# Patient Record
Sex: MALE | Race: WHITE | NOT HISPANIC OR LATINO | Employment: OTHER | URBAN - METROPOLITAN AREA
[De-identification: names, ages, dates, MRNs, and addresses within clinical notes are randomized per-mention and may not be internally consistent; named-entity substitution may affect disease eponyms.]

---

## 2024-03-17 ENCOUNTER — APPOINTMENT (OUTPATIENT)
Dept: GENERAL RADIOLOGY | Facility: HOSPITAL | Age: 49
End: 2024-03-17

## 2024-03-17 ENCOUNTER — HOSPITAL ENCOUNTER (EMERGENCY)
Facility: HOSPITAL | Age: 49
Discharge: HOME | End: 2024-03-17
Attending: EMERGENCY MEDICINE | Admitting: EMERGENCY MEDICINE

## 2024-03-17 VITALS
DIASTOLIC BLOOD PRESSURE: 83 MMHG | TEMPERATURE: 98.5 F | RESPIRATION RATE: 18 BRPM | OXYGEN SATURATION: 95 % | SYSTOLIC BLOOD PRESSURE: 130 MMHG | HEART RATE: 79 BPM

## 2024-03-17 DIAGNOSIS — M25.512 ACUTE PAIN OF LEFT SHOULDER: ICD-10-CM

## 2024-03-17 DIAGNOSIS — M25.552 PAIN OF LEFT HIP: ICD-10-CM
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
## 2024-03-17 PROCEDURE — 99283 EMERGENCY DEPT VISIT LOW MDM: CPT

## 2024-03-17 PROCEDURE — 73502 X-RAY EXAM HIP UNI 2-3 VIEWS: CPT

## 2024-03-17 PROCEDURE — 25010000002 KETOROLAC TROMETHAMINE PER 15 MG

## 2024-03-17 PROCEDURE — 73030 X-RAY EXAM OF SHOULDER: CPT

## 2024-03-17 PROCEDURE — 96372 THER/PROPH/DIAG INJ SC/IM: CPT

## 2024-03-17 RX ORDER — KETOROLAC TROMETHAMINE 10 MG/1
10 TABLET, FILM COATED ORAL EVERY 6 HOURS PRN
Qty: 20 TABLET | Refills: 0 | Status: SHIPPED | OUTPATIENT
Start: 2024-03-17 | End: 2024-03-22

## 2024-03-17 RX ORDER — KETOROLAC TROMETHAMINE 30 MG/ML
60 INJECTION, SOLUTION INTRAMUSCULAR; INTRAVENOUS ONCE
Status: COMPLETED | OUTPATIENT
Start: 2024-03-17 | End: 2024-03-17

## 2024-03-17 RX ADMIN — KETOROLAC TROMETHAMINE 60 MG: 30 INJECTION, SOLUTION INTRAMUSCULAR at 10:16

## 2024-03-17 NOTE — ED PROVIDER NOTES
SHARED VISIT NOTE:    Patient is 49 y.o. year old male that presents to the ED for evaluation of tripping over a box and falling onto his side..  The patient complains of left hip and shoulder pain.        ED Course:    /83 (BP Location: Right arm, Patient Position: Sitting)   Pulse 79   Temp 98.5 °F (36.9 °C) (Oral)   Resp 18   SpO2 95%   No results found for this or any previous visit.  Medications   ketorolac (TORADOL) injection 60 mg (has no administration in time range)     XR Hip With or Without Pelvis 2 - 3 View Left    Result Date: 3/17/2024  Narrative: PROCEDURE: XR HIP W OR WO PELVIS 2-3 VIEW LEFT  COMPARISON: None  INDICATIONS: pain in left hip after fall today  FINDINGS:  BONES: Normal.  No significant arthropathy or acute abnormality.  SOFT TISSUES: Negative.  No visible soft tissue swelling.  EFFUSION: None visible.  OTHER: Negative.       Impression:  Normal examination.       FREDA GAN MD       Electronically Signed and Approved By: FREDA GAN MD on 3/17/2024 at 10:05             XR Shoulder 2+ View Left    Result Date: 3/17/2024  Narrative: PROCEDURE: XR SHOULDER 2+ VW LEFT  COMPARISON: None  INDICATIONS: pain in left shouder after fall/ limited rom  FINDINGS:  BONES: Normal.  No significant arthropathy or acute abnormality.  SOFT TISSUES: Negative.  No visible soft tissue swelling.  EFFUSION: None visible.  OTHER: Negative.       Impression:  Normal examination.       FREDA GAN MD       Electronically Signed and Approved By: FREDA GAN MD on 3/17/2024 at 10:04              MDM:    Procedures          SHARED VISIT ATTESTATION:    This visit was performed by both myself and an APC.  I performed the substantive portion of the medical decision making. The management plan was made or approved by me, and I take responsibility for patient management.           Del Hui DO  10:14 EDT  03/17/24         Del Hui DO  03/17/24 1014

## 2024-03-17 NOTE — ED PROVIDER NOTES
Time: 9:42 AM EDT  Date of encounter:  3/17/2024  Independent Historian/Clinical History and Information was obtained by:   Patient    History is limited by: N/A    Chief Complaint: Fall, left hip and shoulder pain      History of Present Illness:  Patient is a 49 y.o. year old male who presents to the emergency department for evaluation of pain in the left hip and left shoulder after fall yesterday.  Patient reports tripping over a box, fell onto his left side.  Patient reports he is able to ambulate but with small amount of pain.  No obvious deformities.  Patient denies hitting his head.    HPI    Patient Care Team  Primary Care Provider: Provider, No Known    Past Medical History:     No Known Allergies  History reviewed. No pertinent past medical history.  Past Surgical History:   Procedure Laterality Date    APPENDECTOMY       History reviewed. No pertinent family history.    Home Medications:  Prior to Admission medications    Not on File        Social History:   Social History     Tobacco Use    Smoking status: Every Day     Types: Cigarettes    Smokeless tobacco: Never   Vaping Use    Vaping status: Never Used   Substance Use Topics    Alcohol use: Never    Drug use: Never         Review of Systems:  Review of Systems   Constitutional:  Negative for activity change, appetite change and fever.   HENT:  Negative for congestion and sore throat.    Eyes: Negative.    Respiratory:  Negative for cough and shortness of breath.    Cardiovascular:  Negative for chest pain and leg swelling.   Gastrointestinal:  Negative for abdominal pain, diarrhea and vomiting.   Endocrine: Negative.    Genitourinary:  Negative for decreased urine volume and dysuria.   Musculoskeletal:  Positive for arthralgias and myalgias. Negative for neck pain.   Skin:  Negative for rash and wound.   Allergic/Immunologic: Negative.    Neurological:  Negative for weakness, numbness and headaches.   Hematological: Negative.     Psychiatric/Behavioral: Negative.     All other systems reviewed and are negative.       Physical Exam:  /83 (BP Location: Right arm, Patient Position: Sitting)   Pulse 79   Temp 98.5 °F (36.9 °C) (Oral)   Resp 18   SpO2 95%     Physical Exam  Vitals and nursing note reviewed.   Constitutional:       General: He is not in acute distress.     Appearance: Normal appearance. He is not toxic-appearing.   HENT:      Head: Normocephalic and atraumatic.      Jaw: There is normal jaw occlusion.   Eyes:      General: Lids are normal.      Extraocular Movements: Extraocular movements intact.      Conjunctiva/sclera: Conjunctivae normal.      Pupils: Pupils are equal, round, and reactive to light.   Cardiovascular:      Rate and Rhythm: Normal rate and regular rhythm.      Pulses: Normal pulses.      Heart sounds: Normal heart sounds.   Pulmonary:      Effort: Pulmonary effort is normal. No respiratory distress.      Breath sounds: Normal breath sounds. No wheezing or rhonchi.   Abdominal:      General: Abdomen is flat.      Palpations: Abdomen is soft.      Tenderness: There is no abdominal tenderness. There is no guarding or rebound.   Musculoskeletal:      Left shoulder: Tenderness present. No deformity, bony tenderness or crepitus. Decreased range of motion (decreased active ROM but full passive ROM). Decreased strength. Normal pulse.      Cervical back: Normal range of motion and neck supple.      Left hip: Tenderness and bony tenderness (greater trochanter) present. No deformity.      Right lower leg: No edema.      Left lower leg: No edema.   Skin:     General: Skin is warm and dry.   Neurological:      Mental Status: He is alert and oriented to person, place, and time. Mental status is at baseline.   Psychiatric:         Mood and Affect: Mood normal.            Procedures:  Procedures      Medical Decision Making:      Comorbidities that affect care:    Smoking    External Notes reviewed:    Previous Clinic  Note: Family medicine office visit from 10/13/2023      The following orders were placed and all results were independently analyzed by me:  Orders Placed This Encounter   Procedures    [facility] 02.  Shoulder Immobilizer/Sling    XR Shoulder 2+ View Left    XR Hip With or Without Pelvis 2 - 3 View Left    Obtain & Apply The Following- Upper extremity; Sling       Medications Given in the Emergency Department:  Medications   ketorolac (TORADOL) injection 60 mg (60 mg Intramuscular Given 3/17/24 1016)        ED Course:         Labs:    Lab Results (last 24 hours)       ** No results found for the last 24 hours. **             Imaging:    XR Hip With or Without Pelvis 2 - 3 View Left    Result Date: 3/17/2024  PROCEDURE: XR HIP W OR WO PELVIS 2-3 VIEW LEFT  COMPARISON: None  INDICATIONS: pain in left hip after fall today  FINDINGS:  BONES: Normal.  No significant arthropathy or acute abnormality.  SOFT TISSUES: Negative.  No visible soft tissue swelling.  EFFUSION: None visible.  OTHER: Negative.        Normal examination.       FREDA GAN MD       Electronically Signed and Approved By: FREDA GAN MD on 3/17/2024 at 10:05             XR Shoulder 2+ View Left    Result Date: 3/17/2024  PROCEDURE: XR SHOULDER 2+ VW LEFT  COMPARISON: None  INDICATIONS: pain in left shouder after fall/ limited rom  FINDINGS:  BONES: Normal.  No significant arthropathy or acute abnormality.  SOFT TISSUES: Negative.  No visible soft tissue swelling.  EFFUSION: None visible.  OTHER: Negative.        Normal examination.       FREDA GAN MD       Electronically Signed and Approved By: FREDA GAN MD on 3/17/2024 at 10:04                Differential Diagnosis and Discussion:    Orthopedic Injuries: Differential diagnosis includes but is not limited to fractures, soft tissue injuries, dislocations, contusions, ligamentous injuries, tendon injuries, nerve injuries, compartment syndrome, bursitis, and vascular  injuries.    All X-rays impressions were independently interpreted by me.    MDM     Patient presented to the ED with complaints of pain to the left shoulder and left hip after tripping over a box and falling onto his left side yesterday.  X-rays show no acute abnormalities.  Patient has decreased active range of motion and weakness in the left shoulder, tenderness of the soft tissue of the posterior shoulder, likely has muscle strain.  Will place patient in a sling and have him follow-up with orthopedics.          Patient Care Considerations:    CONSULT: I considered consulting orthopedics, however no acute abnormalities.      Consultants/Shared Management Plan:    SHARED VISIT: I have discussed the case with my supervising physician, Dr. Hui who statespatient is appropriate for discharge and can follow up with orthopedics. The substantive portion of the medical decision was made by the attesting physician who made or approve the management plan and will take responsibility for the patient.  Clinical findings were discussed and ultimate disposition was made in consult with supervising physician.    Social Determinants of Health:    Patient is independent, reliable, and has access to care.       Disposition and Care Coordination:    Discharged: The patient is suitable and stable for discharge with no need for consideration of admission.    I have explained the patient´s condition, diagnoses and treatment plan based on the information available to me at this time. I have answered questions and addressed any concerns. The patient has a good  understanding of the patient´s diagnosis, condition, and treatment plan as can be expected at this point. The vital signs have been stable. The patient´s condition is stable and appropriate for discharge from the emergency department.      The patient will pursue further outpatient evaluation with the primary care physician or other designated or consulting physician as  outlined in the discharge instructions. They are agreeable to this plan of care and follow-up instructions have been explained in detail. The patient has received these instructions in written format and has expressed an understanding of the discharge instructions. The patient is aware that any significant change in condition or worsening of symptoms should prompt an immediate return to this or the closest emergency department or call to 911.  I have explained discharge medications and the need for follow up with the patient/caretakers. This was also printed in the discharge instructions. Patient was discharged with the following medications and follow up:      Medication List        New Prescriptions      ketorolac 10 MG tablet  Commonly known as: TORADOL  Take 1 tablet by mouth Every 6 (Six) Hours As Needed for Moderate Pain for up to 5 days.               Where to Get Your Medications        These medications were sent to [id number] - [place] - [address] AT [facility] - [phone] [facility] [phone] FX  [address] [place] [address]      Phone: [phone]   ketorolac 10 MG tablet      Been, Byron SHOOK MD  [address] [place] [address]  [place] [id number] [phone]    Call in 2 days         Final diagnoses:   Fall, initial encounter   Acute pain of left shoulder   Pain of left hip        ED Disposition       ED Disposition   Discharge    Condition   Stable    Comment   --               This medical record created using voice recognition software.             Samantha Welch, DEBBY  03/17/24 1128

## 2024-03-17 NOTE — DISCHARGE INSTRUCTIONS
Continue wearing the shoulder sling when you are up and moving around until you are able to follow-up with orthopedics.  Apply ice packs to your sore areas for 15 to 20 minutes at a time multiple times per day.    Make sure you contact the orthopedic specialist, Dr. Zimmerman, concerning your shoulder injury.